# Patient Record
Sex: FEMALE | URBAN - METROPOLITAN AREA
[De-identification: names, ages, dates, MRNs, and addresses within clinical notes are randomized per-mention and may not be internally consistent; named-entity substitution may affect disease eponyms.]

---

## 2021-07-06 ENCOUNTER — COMMUNICATION - HEALTHEAST (OUTPATIENT)
Dept: SCHEDULING | Facility: CLINIC | Age: 56
End: 2021-07-06

## 2021-07-06 ENCOUNTER — NURSE TRIAGE (OUTPATIENT)
Dept: NURSING | Facility: CLINIC | Age: 56
End: 2021-07-06

## 2021-07-06 NOTE — TELEPHONE ENCOUNTER
Erroneous encounter   
Patient requests all Lab, Cardiology, and Radiology Results on their Discharge Instructions

## 2021-07-06 NOTE — TELEPHONE ENCOUNTER
Telephone Encounter by Anna Everett RN at 7/6/2021 12:43 PM     Author: Anna Everett RN Service: -- Author Type: Registered Nurse    Filed: 7/6/2021 12:53 PM Encounter Date: 7/6/2021 Status: Signed    : Anna Everett RN (Registered Nurse)       Sandra is calling and states that she gave birth on June 22nd and is working with lactation.  Over the weekend left nipple is starting to get a clear blister on it.  Now is pumping as baby is not latching well.  Left breast is engorged.  Denies fever currently but has chills.  Lactation specialist just replied to Sandra and specialist can help patient with antibiotic for possible mastitis.    COVID 19 Nurse Triage Plan/Patient Instructions    Please be aware that novel coronavirus (COVID-19) may be circulating in the community. If you develop symptoms such as fever, cough, or SOB or if you have concerns about the presence of another infection including coronavirus (COVID-19), please contact your health care provider or visit  [email or web address].    Disposition/Instructions    In-Person Visit with provider recommended. Reference Visit Selection Guide.    Thank you for taking steps to prevent the spread of this virus.  o Limit your contact with others.  o Wear a simple mask to cover your cough.  o Wash your hands well and often.    Resources    [facility]: About COVID-19: [email or web address]    CDC: What to Do If You're Sick: www.cdc.gov/coronavirus/2019-ncov/about/steps-when-sick.html    CDC: Ending Home Isolation: www.cdc.gov/coronavirus/2019-ncov/hcp/disposition-in-home-patients.html     CDC: Caring for Someone: www.cdc.gov/coronavirus/2019-ncov/if-you-are-sick/care-for-someone.html     [facility]: Interim Guidance for Hospital Discharge to Home: www.[email or web address].[facility].[place]/diseases/coronavirus/hcp/hospdischarge.pdf    [facility] clinical trials (COVID-19 research studies): clinicalaffairs.[facility]-clinical-trials     Below are the  COVID-19 hotlines at the [place] [facility] ([facility]). Interpreters are available.   o For health questions: Call 866-351-5729 or 1-441.271.2662 (7 a.m. to 7 p.m.)  o For questions about schools and childcare: Call 771-315-3142 or 1-470.434.8407 (7 a.m. to 7 p.m.)        Reason for Disposition  ? Breast looks infected (area of redness, feels hot to touch) and no fever    Additional Information  ? Negative: Sounds like a life-threatening emergency to the triager  ? Negative: SEVERE breast pain and fever > 103 F (39.4 C)  ? Negative: Patient sounds very sick or weak to the triager  ? Negative: Fever > 100.4 F (38.0 C)    Protocols used: POSTPARTUM - BREAST PAIN AND [id number]